# Patient Record
Sex: FEMALE | URBAN - METROPOLITAN AREA
[De-identification: names, ages, dates, MRNs, and addresses within clinical notes are randomized per-mention and may not be internally consistent; named-entity substitution may affect disease eponyms.]

---

## 2024-09-26 ENCOUNTER — OFFICE VISIT (OUTPATIENT)
Dept: URBAN - METROPOLITAN AREA CLINIC 6 | Facility: CLINIC | Age: 64
End: 2024-09-26
Payer: MEDICARE

## 2024-09-26 DIAGNOSIS — H10.45 OTHER CHRONIC ALLERGIC CONJUNCTIVITIS: Primary | ICD-10-CM

## 2024-09-26 DIAGNOSIS — H01.00B BLEPHARITIS OF LEFT EYE, UPPER AND LOWER EYELIDS: ICD-10-CM

## 2024-09-26 DIAGNOSIS — H01.00A BLEPHARITIS OF RIGHT EYE, UPPER AND LOWER EYELIDS: ICD-10-CM

## 2024-09-26 PROCEDURE — 99203 OFFICE O/P NEW LOW 30 MIN: CPT | Performed by: OPTOMETRIST

## 2024-09-26 RX ORDER — PREDNISOLONE ACETATE 10 MG/ML
1 % SUSPENSION/ DROPS OPHTHALMIC
Qty: 5 | Refills: 0 | Status: ACTIVE
Start: 2024-09-26

## 2024-09-26 ASSESSMENT — INTRAOCULAR PRESSURE
OS: 9
OD: 10

## 2024-10-10 ENCOUNTER — OFFICE VISIT (OUTPATIENT)
Dept: URBAN - METROPOLITAN AREA CLINIC 6 | Facility: CLINIC | Age: 64
End: 2024-10-10
Payer: MEDICARE

## 2024-10-10 DIAGNOSIS — H10.45 OTHER CHRONIC ALLERGIC CONJUNCTIVITIS: Primary | ICD-10-CM

## 2024-10-10 PROCEDURE — 99212 OFFICE O/P EST SF 10 MIN: CPT | Performed by: OPTOMETRIST

## 2024-10-10 ASSESSMENT — INTRAOCULAR PRESSURE
OS: 14
OD: 19

## 2024-11-21 ENCOUNTER — APPOINTMENT (RX ONLY)
Dept: URBAN - METROPOLITAN AREA CLINIC 132 | Facility: CLINIC | Age: 64
Setting detail: DERMATOLOGY
End: 2024-11-21

## 2024-11-21 DIAGNOSIS — L259 CONTACT DERMATITIS AND OTHER ECZEMA, UNSPECIFIED CAUSE: ICD-10-CM

## 2024-11-21 PROBLEM — L23.9 ALLERGIC CONTACT DERMATITIS, UNSPECIFIED CAUSE: Status: ACTIVE | Noted: 2024-11-21

## 2024-11-21 PROCEDURE — ? PRESCRIPTION

## 2024-11-21 PROCEDURE — ? ADDITIONAL NOTES

## 2024-11-21 PROCEDURE — ? COUNSELING

## 2024-11-21 PROCEDURE — 99203 OFFICE O/P NEW LOW 30 MIN: CPT

## 2024-11-21 RX ORDER — DESONIDE 0.5 MG/G
CREAM TOPICAL
Qty: 15 | Refills: 2 | Status: ERX | COMMUNITY
Start: 2024-11-21

## 2024-11-21 RX ADMIN — DESONIDE: 0.5 CREAM TOPICAL at 00:00

## 2024-11-21 ASSESSMENT — ITCH NUMERIC RATING SCALE: HOW SEVERE IS YOUR ITCHING?: 0

## 2024-11-21 ASSESSMENT — LOCATION DETAILED DESCRIPTION DERM
LOCATION DETAILED: RIGHT MEDIAL INFERIOR PRETARSAL REGION
LOCATION DETAILED: LEFT LATERAL INFERIOR PRESEPTAL REGION
LOCATION DETAILED: RIGHT LATERAL SUPERIOR EYELID
LOCATION DETAILED: LEFT LATERAL SUPERIOR EYELID

## 2024-11-21 ASSESSMENT — LOCATION SIMPLE DESCRIPTION DERM
LOCATION SIMPLE: RIGHT SUPERIOR EYELID
LOCATION SIMPLE: RIGHT MEDIAL INFERIOR PRETARSAL REGION
LOCATION SIMPLE: LEFT SUPERIOR EYELID
LOCATION SIMPLE: LEFT INFERIOR EYELID

## 2024-11-21 ASSESSMENT — LOCATION ZONE DERM: LOCATION ZONE: EYELID

## 2024-11-21 NOTE — HPI: RASH
Is The Patient Presenting As Previously Scheduled?: No, they are coming in before their scheduled appointment
How Severe Is Your Rash?: moderate
Is This A New Presentation, Or A Follow-Up?: Rash
Additional History: Steroid injection was given x 4 days ago.  Last used eye creams and solution x 2 months.  Had recent labs

## 2024-11-21 NOTE — PROCEDURE: ADDITIONAL NOTES
Detail Level: Simple
Render Risk Assessment In Note?: no
Additional Notes: Plan to schedule a patch allergy skin test

## 2025-02-24 ENCOUNTER — APPOINTMENT (OUTPATIENT)
Dept: URBAN - METROPOLITAN AREA CLINIC 129 | Facility: CLINIC | Age: 65
Setting detail: DERMATOLOGY
End: 2025-02-24

## 2025-02-24 DIAGNOSIS — L23.9 ALLERGIC CONTACT DERMATITIS, UNSPECIFIED CAUSE: ICD-10-CM

## 2025-02-24 PROCEDURE — ? ADDITIONAL NOTES

## 2025-02-24 PROCEDURE — 95044 PATCH/APPLICATION TESTS: CPT

## 2025-02-24 PROCEDURE — ? PATCH TESTING

## 2025-02-24 NOTE — PROCEDURE: PATCH TESTING
Post-Care Instructions: I reviewed with the patient in detail post-care instructions. Patient should not sweat, pick at, or get the patches wet for 48 hours.
Detail Level: Simple
Consent: Verbal consent obtained, risks reviewed including but not limited to rash, itching, allergic reaction, systemic rash, remote possibility of anaphylaxis to allergen.
Number Of Patches (Maximum Allowable Per Dos By Cms Is 90): 79

## 2025-02-26 ENCOUNTER — APPOINTMENT (OUTPATIENT)
Dept: URBAN - METROPOLITAN AREA CLINIC 129 | Facility: CLINIC | Age: 65
Setting detail: DERMATOLOGY
End: 2025-02-26

## 2025-02-26 DIAGNOSIS — L23.9 ALLERGIC CONTACT DERMATITIS, UNSPECIFIED CAUSE: ICD-10-CM

## 2025-02-26 PROCEDURE — ? NORTH AMERICAN 80 PATCH TEST READING

## 2025-02-26 PROCEDURE — 99213 OFFICE O/P EST LOW 20 MIN: CPT

## 2025-02-26 NOTE — PROCEDURE: NORTH AMERICAN 79 PATCH TEST READING
Name Of Allergen 47: Den mix III
Allergen 13 Reaction: no reaction
Show Allergen Counseling In The Note?: Yes
Name Of Allergen 41: Paraben Mix
Name Of Allergen 66: Tixocortol-21-pivalate
Name Of Allergen 31: Hydroperoxides of Limonene
Name Of Allergen 61: Sodium Benzoate
Name Of Allergen 51: Textile dye mix
Name Of Allergen 38: 2-Mercaptobenzothiazole (MBT)
Name Of Allergen 9: 2-evsv-Blkonbyzlrojarwfnvkkcef resin (PTBP)
Name Of Allergen 40: Thiuram mix
Name Of Allergen 10: Bacitracin
Name Of Allergen 14: Cinnamal
Name Of Allergen 58: Propolis
Detail Level: Zone
Name Of Allergen 1: Amerchol L-101
What Reading Time Point?: 48 hour
Name Of Allergen 45: Fragrance Mix I
Name Of Allergen 20: 1,3-Diphenylguanidine
Name Of Allergen 59: Polymyxin B sulfate
Name Of Allergen 73: Chlorhexidine Digluconate
Name Of Allergen 77: Formaldehyde
Name Of Allergen 12: Quaternium-15
Name Of Allergen 15: Cobalt(II)chloride hexahydrate
Name Of Allergen 24: Decyl Glucoside
Name Of Allergen 54: 2-n-Octyl-4-isothiazolin-3-one
Name Of Allergen 29: Benzophenone-4
Name Of Allergen 78: Methylisothiazolinone
Name Of Allergen 53: Nickel(II)sulfate hexahydrate
Name Of Allergen 32: Imidazolidinyl Urea
Name Of Allergen 68: Tocopherol
Name Of Allergen 23: Methyldibromo glutaronitrile (MDBGN)
Name Of Allergen 79: Oleamidopropyl dimethylamine
Name Of Allergen 30: Hydroperoxides of Linalool
Name Of Allergen 43: Mercapto mix
Name Of Allergen 6: Benzyl Alcohol
Name Of Allergen 46: Sesquiterpene lactone mix
Name Of Allergen 60: Pramoxine hydrochloride
Name Of Allergen 11: Budesonide
Name Of Allergen 49: Fragrance Mix II
Name Of Allergen 34: Iodopropynyl butyl carbamate
Name Of Allergen 21: Diazolidinyl Urea
Name Of Allergen 57: Propyl Gallate
Name Of Allergen 67: Tea tree oil oxidized
Name Of Allergen 69: Lanolin Alcohol
Name Of Allergen 42: Black rubber mix
Name Of Allergen 71: Amidoamine
Name Of Allergen 16: Cocamide Yareli
Name Of Allergen 72: Benzalkonuim Chloride
Name Of Allergen 8: 2-Bromo-2-Nitropropane-1,3-Diol
Name Of Allergen 25: Epoxy resin, Bisphenol A
Name Of Allergen 70: Ylang ylang oil
Name Of Allergen 18: Clobetasol-17-propionate
Name Of Allergen 75: Cocamidopropyl Betaine
Name Of Allergen 19: Toluene-2,5-Diamine Sulfate
Name Of Allergen 39: Methyl methacrylate
Name Of Allergen 50: Compositae Mix II
Name Of Allergen 56: Potassium dichromate
Name Of Allergen 27: Ethylenediamine dihydrochloride
Name Of Allergen 3: [place] balsam
Name Of Allergen 13: Chloroxylenol (PCMX)
Name Of Allergen 5: Benzocaine
Number Of Patches Read: 79
Name Of Allergen 80: Propylene glycol
Name Of Allergen 7: Benzyl Salicylate
Name Of Allergen 35: Lidocaine
Name Of Allergen 52: Neomycin sulfate
Name Of Allergen 4: Benzisothiazolinone
Name Of Allergen 33: N-Isopropyl-N-phenyl-4-phenylenediamine (IPPD)
Name Of Allergen 48: Mixed dialkyl thiourea
Name Of Allergen 2: Ammonium Persulfate
Name Of Allergen 17: Colophonium
Name Of Allergen 64: Sodium Metabisulfite
Name Of Allergen 65: Toluenesulfonamide formaldehyde resin
Name Of Allergen 62: Sorbitan Benzoate
Name Of Allergen 76: 3-(Dimethylamino)-1-propylamine
Name Of Allergen 37: Lauryl Polyglucose
Name Of Allergen 55: p-Phenylenediamine PPD
Name Of Allergen 63: Sorbitan Sesquioleate
Name Of Allergen 22: DMDM Hydantoin
Name Of Allergen 36: Hydroxyisohexyl 3-Cyclohexene Carboxaldehyde
Name Of Allergen 28: 2-Hydroxyethyl methacrylate
Name Of Allergen 74: Methylisothiazolinone + Methylchloroisothiazolinone
Name Of Allergen 26: Ethyl acrylate
Name Of Allergen 44: Carba mix

## 2025-02-28 ENCOUNTER — APPOINTMENT (OUTPATIENT)
Dept: URBAN - METROPOLITAN AREA CLINIC 129 | Facility: CLINIC | Age: 65
Setting detail: DERMATOLOGY
End: 2025-02-28

## 2025-02-28 DIAGNOSIS — L20.89 OTHER ATOPIC DERMATITIS: ICD-10-CM | Status: INADEQUATELY CONTROLLED

## 2025-02-28 DIAGNOSIS — L23.9 ALLERGIC CONTACT DERMATITIS, UNSPECIFIED CAUSE: ICD-10-CM

## 2025-02-28 PROCEDURE — ? COUNSELING

## 2025-02-28 PROCEDURE — 99214 OFFICE O/P EST MOD 30 MIN: CPT

## 2025-02-28 PROCEDURE — ? NORTH AMERICAN 80 PATCH TEST READING

## 2025-02-28 PROCEDURE — ? PRESCRIPTION

## 2025-02-28 RX ORDER — TACROLIMUS 1 MG/G
OINTMENT TOPICAL
Qty: 30 | Refills: 5 | Status: ERX | COMMUNITY
Start: 2025-02-28

## 2025-02-28 RX ADMIN — TACROLIMUS: 1 OINTMENT TOPICAL at 00:00

## 2025-02-28 ASSESSMENT — LOCATION DETAILED DESCRIPTION DERM
LOCATION DETAILED: RIGHT LATERAL INFERIOR EYELID
LOCATION DETAILED: LEFT LATERAL INFERIOR EYELID

## 2025-02-28 ASSESSMENT — LOCATION SIMPLE DESCRIPTION DERM
LOCATION SIMPLE: LEFT INFERIOR EYELID
LOCATION SIMPLE: RIGHT INFERIOR EYELID

## 2025-02-28 ASSESSMENT — LOCATION ZONE DERM: LOCATION ZONE: EYELID

## 2025-02-28 NOTE — PROCEDURE: [employer]ICAN 80 PATCH TEST READING
Name Of Allergen 61: Sodium Benzoate
Allergen 71 Reaction: no reaction
Name Of Allergen 33: N-Isopropyl-N-phenyl-4-phenylenediamine (IPPD)
Name Of Allergen 30: Hydroperoxides of Linalool
Name Of Allergen 23: Methyldibromo glutaronitrile (MDBGN)
Name Of Allergen 51: Textile dye mix
Name Of Allergen 41: Paraben Mix
Name Of Allergen 16: Cocamide Yareli
Name Of Allergen 46: Sesquiterpene lactone mix
Name Of Allergen 9: 8-pdrr-Yxvxaejnqpueoixtdrotgmm resin (PTBP)
Name Of Allergen 43: Mercapto mix
Name Of Allergen 3: [place] balsam
Name Of Allergen 49: Fragrance Mix II
Name Of Allergen 8: 2-Bromo-2-Nitropropane-1,3-Diol
What Reading Time Point?: 48 hour
Name Of Allergen 18: Clobetasol-17-propionate
Name Of Allergen 38: 2-Mercaptobenzothiazole (MBT)
Name Of Allergen 5: Benzocaine
Name Of Allergen 29: Benzophenone-4
Name Of Allergen 19: Toluene-2,5-Diamine Sulfate
Name Of Allergen 37: Lauryl Polyglucose
Name Of Allergen 48: Mixed dialkyl thiourea
Name Of Allergen 25: Epoxy resin, Bisphenol A
Name Of Allergen 2: Ammonium Persulfate
Name Of Allergen 39: Methyl methacrylate
Name Of Allergen 53: Nickel(II)sulfate hexahydrate
Name Of Allergen 17: Colophonium
Name Of Allergen 69: Lanolin Alcohol
Name Of Allergen 80: Propylene glycol
Name Of Allergen 56: Potassium dichromate
Show Negative Results In The Note?: Yes
Name Of Allergen 11: Budesonide
Name Of Allergen 42: Black rubber mix
Name Of Allergen 75: Cocamidopropyl Betaine
Name Of Allergen 50: Compositae Mix II
Name Of Allergen 6: Benzyl Alcohol
Name Of Allergen 70: Ylang ylang oil
Name Of Allergen 35: Lidocaine
Name Of Allergen 36: Hydroxyisohexyl 3-Cyclohexene Carboxaldehyde
Name Of Allergen 13: Chloroxylenol (PCMX)
Name Of Allergen 64: Sodium Metabisulfite
Name Of Allergen 63: Sorbitan Sesquioleate
Name Of Allergen 26: Ethyl acrylate
Name Of Allergen 21: Diazolidinyl Urea
Name Of Allergen 72: Benzalkonuim Chloride
Name Of Allergen 44: Carba mix
Name Of Allergen 31: Hydroperoxides of Limonene
Detail Level: Zone
Name Of Allergen 52: Neomycin sulfate
Name Of Allergen 40: Thiuram mix
Name Of Allergen 58: Propolis
Name Of Allergen 28: 2-Hydroxyethyl methacrylate
Name Of Allergen 45: Fragrance Mix I
Name Of Allergen 77: Formaldehyde
Name Of Allergen 65: Toluenesulfonamide formaldehyde resin
Name Of Allergen 12: Quaternium-15
Name Of Allergen 66: Tixocortol-21-pivalate
Name Of Allergen 73: Chlorhexidine Digluconate
Name Of Allergen 54: 2-n-Octyl-4-isothiazolin-3-one
Name Of Allergen 22: DMDM Hydantoin
Name Of Allergen 24: Decyl Glucoside
Name Of Allergen 27: Ethylenediamine dihydrochloride
Name Of Allergen 10: Bacitracin
Name Of Allergen 47: Dne mix III
Name Of Allergen 60: Pramoxine hydrochloride
Name Of Allergen 74: Methylisothiazolinone + Methylchloroisothiazolinone
Number Of Patches Read: 79
Name Of Allergen 4: Benzisothiazolinone
Name Of Allergen 78: Methylisothiazolinone
Name Of Allergen 32: Imidazolidinyl Urea
Name Of Allergen 1: Amerchol L-101
Name Of Allergen 62: Sorbitan Benzoate
Name Of Allergen 68: Tocopherol
Name Of Allergen 14: Cinnamal
Name Of Allergen 15: Cobalt(II)chloride hexahydrate
Name Of Allergen 76: 3-(Dimethylamino)-1-propylamine
Name Of Allergen 71: Amidoamine
Name Of Allergen 67: Tea tree oil oxidized
Name Of Allergen 59: Polymyxin B sulfate
Name Of Allergen 79: Oleamidopropyl dimethylamine
Name Of Allergen 34: Iodopropynyl butyl carbamate
Name Of Allergen 55: p-Phenylenediamine PPD
Name Of Allergen 20: 1,3-Diphenylguanidine
Name Of Allergen 7: Benzyl Salicylate
Name Of Allergen 57: Propyl Gallate

## 2025-03-04 ENCOUNTER — RX ONLY (RX ONLY)
Age: 65
End: 2025-03-04

## 2025-03-04 RX ORDER — TACROLIMUS 1 MG/G
OINTMENT TOPICAL
Qty: 30 | Refills: 5 | Status: ERX | COMMUNITY
Start: 2025-03-04